# Patient Record
(demographics unavailable — no encounter records)

---

## 2024-11-13 NOTE — ASSESSMENT
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 I made the patient aware of the need for year-round protection. ABCDEs of melanoma were also reviewed. - Recommend sunscreen at least SPF 30  RTC as needed

## 2024-11-13 NOTE — HISTORY OF PRESENT ILLNESS
[FreeTextEntry1] : NP mole on chin [de-identified] : 11/13/2024 04:54 PM MERNA RHODES is a 8 year M presenting today for evaluation of the following:  # mole on L chin/lip, asx  Mother with history of melanoma

## 2024-11-13 NOTE — HISTORY OF PRESENT ILLNESS
[FreeTextEntry1] : NP mole on chin [de-identified] : 11/13/2024 04:54 PM MERNA RHODES is a 8 year M presenting today for evaluation of the following:  # mole on L chin/lip, asx  Mother with history of melanoma

## 2025-05-12 NOTE — PROCEDURE
[FreeTextEntry1] : Fiberoptic Laryngoscopy [FreeTextEntry2] : Dysphonia [FreeTextEntry3] : Patient is unable to cooperate for mirror exam. After informed verbal consent is obtained. The fiberoptic laryngoscope is passed via the right nasal cavity. 60% adenoids.  Findings: Base of tongue and vallecula are clear. The hypopharynx is clear with no lesions or masses and no evidence of pooled secretions. Crisp epiglottis. The vocal cords are clear intact, within normal limits and mobile bilaterally.  There is no significant arytenoid edema or erythema.

## 2025-05-12 NOTE — HISTORY OF PRESENT ILLNESS
[No Personal or Family History of Easy Bruising, Bleeding, or Issues with General Anesthesia] : No Personal or Family History of easy bruising, bleeding, or issues with general anesthesia [de-identified] : 9 year old M here for voice changes and snoring  Patient says since February his voice has deepened  +Snoring at night  Occasional pausing and choking during sleep  Daytime tiredness  Has ADHD- on Methylphenidate  +Bedwetting   No chronic nasal congestion  has never used nasal steroid spray   No recent ear infections  No recent throat infections  No concerns for hearing or speech  Passed [facility]

## 2025-05-12 NOTE — PHYSICAL EXAM
[Exposed Vessel] : right anterior vessel exposed [3+] : 3+ [Normal Gait and Station] : normal gait and station [Normal muscle strength, symmetry and tone of facial, head and neck musculature] : normal muscle strength, symmetry and tone of facial, head and neck musculature [Normal] : the left nasal cavity was normal [Mild] : mild left inferior turbinate hypertrophy [Increased Work of Breathing] : no increased work of breathing with use of accessory muscles and retractions

## 2025-05-12 NOTE — CONSULT LETTER
[Dear  ___] : Dear  [unfilled], [Courtesy Letter:] : I had the pleasure of seeing your patient, [unfilled], in my office today. [Please see my note below.] : Please see my note below. [Consult Closing:] : Thank you very much for allowing me to participate in the care of this patient.  If you have any questions, please do not hesitate to contact me. [Sincerely,] : Sincerely, [FreeTextEntry2] : Dr. Amadeo Brunner  [address] [place] [id number]   [phone] [FreeTextEntry3] : Chang Maddox MD Chief, Pediatric Otolaryngology [facility] and Stormy Azevedo [facility] Professor of Otolaryngology [employer] at [facility]

## 2025-07-24 NOTE — ASSESSMENT
[FreeTextEntry1] : Haile is a 9-year-old boy who has mild external tibial torsion which is consistent with his normal body mechanics.  He shows no signs of discomfort. Today's assessment was performed with the assistance of the patient's parent as an independent historian as the patient's history is unreliable.  There is no orthopedic intervention warranted at this time.  There is no orthopedic bracing needed.  He may participate in activities as tolerated.  He may follow-up on an as-needed basis.  We had a thorough talk in regard to the diagnosis, prognosis and treatment modalities.  All questions and concerns were addressed today. There was a verbal understanding from the parents and patient.   JANEL Floyd have acted as a scribe and documented the above information for Dr. Reyes.   This note was generated using Dragon medical dictation software. A reasonable effort has been made for proofreading its contents; however, typos may still remain. If there are any questions or points of clarification needed, please do not hesitate to contact my office.   The above documentation completed by the scribe is an accurate record of both my words and actions.   Dr. Reyes.

## 2025-07-24 NOTE — PHYSICAL EXAM
[FreeTextEntry1] : Pleasant and cooperative with exam, appropriate for age. Ambulates without evidence of antalgia and limp, good coordination and balance.  Foot progression angle of 15 degrees externally bilaterally. AAOX3  Skin: No rashes noted.  Eyes: Both conjunctiva, eyelids and pupils are present.  ENT:  Both ears, nose and lips are present. No nasal congestion.  Resp: No cough or wheezing noted.  Bilateral lower extremities: Full active and passive range of motion of both hips with no discomfort.  5\5 muscle strength.  No leg length discrepancy noted.  Thigh foot angles of 15 to 20 degrees externally consistent with external tibial torsion.  Knee and ankle joint is stable with stress maneuvers.  Bilateral Feet: There is full active and passive range of motion of the foot with no discomfort. The patient has a good arch noted. There are no signs of edema, ecchymoses or erythema over the joints. Muscle strength is 5/5, neurologically intact. Skin is warm to touch intact. 2+ pulses palpated. Capillary refill +1 in all 5 digits. The joint is stable with stress maneuvers . There is no discomfort with palpation over the navicular bone, sinus Tarsi, or any of the metatarsal rays. There is good flexibility in the midfoot.  There is no pain with palpation over the calcaneus.

## 2025-07-24 NOTE — END OF VISIT
[FreeTextEntry3] : I, Lee Reyes MD, I personally performed the services described in the documentation, reviewed the documentation recorded by the scribe in my presence and it accurately and completely records my words and actions

## 2025-07-24 NOTE — HISTORY OF PRESENT ILLNESS
[FreeTextEntry1] : Haile is a 9-year-old boy who presents today with a chief complaint of out-toeing.  He is quite active with no complaints of significant lower extremity discomfort.  He is currently asymptomatic.  Mom is concerned about his out-toeing.  He presents today for pediatric orthopedic consultation.